# Patient Record
Sex: FEMALE | Race: WHITE | NOT HISPANIC OR LATINO | Employment: OTHER | URBAN - METROPOLITAN AREA
[De-identification: names, ages, dates, MRNs, and addresses within clinical notes are randomized per-mention and may not be internally consistent; named-entity substitution may affect disease eponyms.]

---

## 2022-03-21 DIAGNOSIS — Z11.59 ENCOUNTER FOR SCREENING FOR OTHER VIRAL DISEASES: Primary | ICD-10-CM

## 2022-04-12 RX ORDER — ROSUVASTATIN CALCIUM 10 MG/1
10 TABLET, COATED ORAL AT BEDTIME
COMMUNITY

## 2022-04-15 ENCOUNTER — ANESTHESIA EVENT (OUTPATIENT)
Dept: SURGERY | Facility: CLINIC | Age: 77
End: 2022-04-15
Payer: MEDICARE

## 2022-04-15 ENCOUNTER — LAB (OUTPATIENT)
Dept: LAB | Facility: CLINIC | Age: 77
End: 2022-04-15
Attending: ORTHOPAEDIC SURGERY
Payer: MEDICARE

## 2022-04-15 DIAGNOSIS — Z11.59 ENCOUNTER FOR SCREENING FOR OTHER VIRAL DISEASES: ICD-10-CM

## 2022-04-15 LAB — SARS-COV-2 RNA RESP QL NAA+PROBE: NEGATIVE

## 2022-04-15 PROCEDURE — U0005 INFEC AGEN DETEC AMPLI PROBE: HCPCS

## 2022-04-15 PROCEDURE — U0003 INFECTIOUS AGENT DETECTION BY NUCLEIC ACID (DNA OR RNA); SEVERE ACUTE RESPIRATORY SYNDROME CORONAVIRUS 2 (SARS-COV-2) (CORONAVIRUS DISEASE [COVID-19]), AMPLIFIED PROBE TECHNIQUE, MAKING USE OF HIGH THROUGHPUT TECHNOLOGIES AS DESCRIBED BY CMS-2020-01-R: HCPCS

## 2022-04-15 RX ORDER — VITAMIN B COMPLEX
1 TABLET ORAL DAILY
COMMUNITY

## 2022-04-15 NOTE — PROGRESS NOTES
PTA medications updated by Medication Scribe prior to surgery via phone call with patient (last doses completed by Nurse)     Medication history sources: Patient, H&P and Patient's home med list  In the past week, patient estimated taking medication this percent of the time: Greater than 90%  Adherence assessment: N/A Not Observed    Significant changes made to the medication list:  None      Additional medication history information:   None    Medication reconciliation completed by provider prior to medication history? No    Time spent in this activity: 35 minutes    The information provided in this note is only as accurate as the sources available at the time of update(s)    Prior to Admission medications    Medication Sig Last Dose Taking? Auth Provider   apixaban ANTICOAGULANT (ELIQUIS) 5 MG tablet Take 5 mg by mouth in the morning and 5 mg in the evening. 4/15/2022 at pm Yes Reported, Patient   Cephalexin (KEFLEX PO) Take 2,000 mg by mouth daily as needed (Prior to dental appointment 4 x 500 mg = 2000 mg)  at prn Yes Reported, Patient   Emollient (AVEENO CREAMY MOISTURIZING EX) Externally apply topically daily 4/15/2022 at am Yes Reported, Patient   FOLIC ACID PO Take 1 mg by mouth daily 4/15/2022 at am Yes Reported, Patient   rosuvastatin (CRESTOR) 10 MG tablet Take 10 mg by mouth At Bedtime  at pm Yes Reported, Patient   Vitamin D3 (CHOLECALCIFEROL) 25 mcg (1000 units) tablet Take 1 tablet by mouth daily 4/15/2022 at am Yes Reported, Patient   Zinc 50 MG CAPS Take 1 capsule by mouth daily 4/15/2022 at am Yes Reported, Patient   zolpidem (AMBIEN) 5 MG tablet Take 5 mg by mouth nightly as needed for sleep  at prn Yes Reported, Patient     Medication history completed by:    Morales Tadeo CPhT  Medication Scribe  [facility]

## 2022-04-18 ENCOUNTER — ANESTHESIA (OUTPATIENT)
Dept: SURGERY | Facility: CLINIC | Age: 77
End: 2022-04-18
Payer: MEDICARE

## 2022-04-18 ENCOUNTER — HOSPITAL ENCOUNTER (OUTPATIENT)
Facility: CLINIC | Age: 77
Discharge: HOME | End: 2022-04-18
Attending: ORTHOPAEDIC SURGERY | Admitting: ORTHOPAEDIC SURGERY
Payer: MEDICARE

## 2022-04-18 ENCOUNTER — APPOINTMENT (OUTPATIENT)
Dept: GENERAL RADIOLOGY | Facility: CLINIC | Age: 77
End: 2022-04-18
Attending: ORTHOPAEDIC SURGERY
Payer: MEDICARE

## 2022-04-18 VITALS
SYSTOLIC BLOOD PRESSURE: 127 MMHG | OXYGEN SATURATION: 95 % | TEMPERATURE: 98.5 F | BODY MASS INDEX: 37.97 KG/M2 | HEIGHT: 64 IN | RESPIRATION RATE: 18 BRPM | DIASTOLIC BLOOD PRESSURE: 84 MMHG | HEART RATE: 61 BPM | WEIGHT: 222.4 LBS

## 2022-04-18 DIAGNOSIS — Z98.890 S/P BUNIONECTOMY: Primary | ICD-10-CM

## 2022-04-18 PROCEDURE — 250N000011 HC RX IP 250 OP 636: Performed by: PHYSICIAN ASSISTANT

## 2022-04-18 PROCEDURE — 999N000179 XR SURGERY CARM FLUORO LESS THAN 5 MIN W STILLS

## 2022-04-18 PROCEDURE — 272N000001 HC OR GENERAL SUPPLY STERILE: Performed by: ORTHOPAEDIC SURGERY

## 2022-04-18 PROCEDURE — 370N000017 HC ANESTHESIA TECHNICAL FEE, PER MIN: Performed by: ORTHOPAEDIC SURGERY

## 2022-04-18 PROCEDURE — 258N000003 HC RX IP 258 OP 636: Performed by: ANESTHESIOLOGY

## 2022-04-18 PROCEDURE — 250N000011 HC RX IP 250 OP 636: Performed by: ORTHOPAEDIC SURGERY

## 2022-04-18 PROCEDURE — 250N000013 HC RX MED GY IP 250 OP 250 PS 637: Performed by: PHYSICIAN ASSISTANT

## 2022-04-18 PROCEDURE — 250N000009 HC RX 250: Performed by: NURSE ANESTHETIST, CERTIFIED REGISTERED

## 2022-04-18 PROCEDURE — 250N000025 HC SEVOFLURANE, PER MIN: Performed by: ORTHOPAEDIC SURGERY

## 2022-04-18 PROCEDURE — 710N000009 HC RECOVERY PHASE 1, LEVEL 1, PER MIN: Performed by: ORTHOPAEDIC SURGERY

## 2022-04-18 PROCEDURE — 360N000083 HC SURGERY LEVEL 3 W/ FLUORO, PER MIN: Performed by: ORTHOPAEDIC SURGERY

## 2022-04-18 PROCEDURE — 250N000009 HC RX 250: Performed by: ORTHOPAEDIC SURGERY

## 2022-04-18 PROCEDURE — 710N000012 HC RECOVERY PHASE 2, PER MINUTE: Performed by: ORTHOPAEDIC SURGERY

## 2022-04-18 PROCEDURE — 271N000001 HC OR GENERAL SUPPLY NON-STERILE: Performed by: ORTHOPAEDIC SURGERY

## 2022-04-18 PROCEDURE — C1713 ANCHOR/SCREW BN/BN,TIS/BN: HCPCS | Performed by: ORTHOPAEDIC SURGERY

## 2022-04-18 PROCEDURE — 250N000011 HC RX IP 250 OP 636: Performed by: NURSE ANESTHETIST, CERTIFIED REGISTERED

## 2022-04-18 PROCEDURE — 999N000141 HC STATISTIC PRE-PROCEDURE NURSING ASSESSMENT: Performed by: ORTHOPAEDIC SURGERY

## 2022-04-18 DEVICE — IMP SCR SYN CORTEX 2.0X16MM SELF TAP SS 201.816: Type: IMPLANTABLE DEVICE | Site: FOOT | Status: FUNCTIONAL

## 2022-04-18 DEVICE — IMP SCR SYN CORTEX 1.5X16MM SELF TAP SS 200.816: Type: IMPLANTABLE DEVICE | Site: FOOT | Status: FUNCTIONAL

## 2022-04-18 RX ORDER — OXYMETAZOLINE HYDROCHLORIDE 0.05 G/100ML
2 SPRAY NASAL 2 TIMES DAILY
COMMUNITY

## 2022-04-18 RX ORDER — CEFAZOLIN SODIUM/WATER 2 G/20 ML
2 SYRINGE (ML) INTRAVENOUS SEE ADMIN INSTRUCTIONS
Status: DISCONTINUED | OUTPATIENT
Start: 2022-04-18 | End: 2022-04-18 | Stop reason: HOSPADM

## 2022-04-18 RX ORDER — CEFAZOLIN SODIUM/WATER 2 G/20 ML
2 SYRINGE (ML) INTRAVENOUS
Status: COMPLETED | OUTPATIENT
Start: 2022-04-18 | End: 2022-04-18

## 2022-04-18 RX ORDER — ONDANSETRON 4 MG/1
4 TABLET, ORALLY DISINTEGRATING ORAL
Status: DISCONTINUED | OUTPATIENT
Start: 2022-04-18 | End: 2022-04-18 | Stop reason: HOSPADM

## 2022-04-18 RX ORDER — HYDROXYZINE HYDROCHLORIDE 10 MG/1
10 TABLET, FILM COATED ORAL
Status: DISCONTINUED | OUTPATIENT
Start: 2022-04-18 | End: 2022-04-18 | Stop reason: HOSPADM

## 2022-04-18 RX ORDER — ONDANSETRON 2 MG/ML
INJECTION INTRAMUSCULAR; INTRAVENOUS PRN
Status: DISCONTINUED | OUTPATIENT
Start: 2022-04-18 | End: 2022-04-18

## 2022-04-18 RX ORDER — DEXAMETHASONE SODIUM PHOSPHATE 4 MG/ML
INJECTION, SOLUTION INTRA-ARTICULAR; INTRALESIONAL; INTRAMUSCULAR; INTRAVENOUS; SOFT TISSUE PRN
Status: DISCONTINUED | OUTPATIENT
Start: 2022-04-18 | End: 2022-04-18

## 2022-04-18 RX ORDER — HYDROCODONE BITARTRATE AND ACETAMINOPHEN 5; 325 MG/1; MG/1
1 TABLET ORAL
Status: COMPLETED | OUTPATIENT
Start: 2022-04-18 | End: 2022-04-18

## 2022-04-18 RX ORDER — HYDROCODONE BITARTRATE AND ACETAMINOPHEN 5; 325 MG/1; MG/1
1-2 TABLET ORAL EVERY 4 HOURS PRN
Qty: 30 TABLET | Refills: 0 | Status: SHIPPED | OUTPATIENT
Start: 2022-04-18

## 2022-04-18 RX ORDER — OXYCODONE HYDROCHLORIDE 5 MG/1
5 TABLET ORAL EVERY 4 HOURS PRN
Status: DISCONTINUED | OUTPATIENT
Start: 2022-04-18 | End: 2022-04-18 | Stop reason: HOSPADM

## 2022-04-18 RX ORDER — FENTANYL CITRATE 0.05 MG/ML
50 INJECTION, SOLUTION INTRAMUSCULAR; INTRAVENOUS EVERY 5 MIN PRN
Status: DISCONTINUED | OUTPATIENT
Start: 2022-04-18 | End: 2022-04-18 | Stop reason: HOSPADM

## 2022-04-18 RX ORDER — ONDANSETRON 4 MG/1
4-8 TABLET, ORALLY DISINTEGRATING ORAL EVERY 6 HOURS PRN
Qty: 12 TABLET | Refills: 1 | Status: SHIPPED | OUTPATIENT
Start: 2022-04-18

## 2022-04-18 RX ORDER — HYDROMORPHONE HCL IN WATER/PF 6 MG/30 ML
0.2 PATIENT CONTROLLED ANALGESIA SYRINGE INTRAVENOUS EVERY 5 MIN PRN
Status: DISCONTINUED | OUTPATIENT
Start: 2022-04-18 | End: 2022-04-18 | Stop reason: HOSPADM

## 2022-04-18 RX ORDER — PROPOFOL 10 MG/ML
INJECTION, EMULSION INTRAVENOUS CONTINUOUS PRN
Status: DISCONTINUED | OUTPATIENT
Start: 2022-04-18 | End: 2022-04-18

## 2022-04-18 RX ORDER — DEXMEDETOMIDINE HYDROCHLORIDE 4 UG/ML
INJECTION, SOLUTION INTRAVENOUS PRN
Status: DISCONTINUED | OUTPATIENT
Start: 2022-04-18 | End: 2022-04-18

## 2022-04-18 RX ORDER — ROPIVACAINE HYDROCHLORIDE 5 MG/ML
INJECTION, SOLUTION EPIDURAL; INFILTRATION; PERINEURAL PRN
Status: DISCONTINUED | OUTPATIENT
Start: 2022-04-18 | End: 2022-04-18 | Stop reason: HOSPADM

## 2022-04-18 RX ORDER — SODIUM CHLORIDE, SODIUM LACTATE, POTASSIUM CHLORIDE, CALCIUM CHLORIDE 600; 310; 30; 20 MG/100ML; MG/100ML; MG/100ML; MG/100ML
INJECTION, SOLUTION INTRAVENOUS CONTINUOUS
Status: DISCONTINUED | OUTPATIENT
Start: 2022-04-18 | End: 2022-04-18 | Stop reason: HOSPADM

## 2022-04-18 RX ORDER — LIDOCAINE HYDROCHLORIDE 20 MG/ML
INJECTION, SOLUTION INFILTRATION; PERINEURAL PRN
Status: DISCONTINUED | OUTPATIENT
Start: 2022-04-18 | End: 2022-04-18

## 2022-04-18 RX ORDER — ROPIVACAINE HYDROCHLORIDE 5 MG/ML
INJECTION, SOLUTION EPIDURAL; INFILTRATION; PERINEURAL
Status: DISCONTINUED
Start: 2022-04-18 | End: 2022-04-18 | Stop reason: HOSPADM

## 2022-04-18 RX ORDER — LABETALOL HYDROCHLORIDE 5 MG/ML
10 INJECTION, SOLUTION INTRAVENOUS
Status: DISCONTINUED | OUTPATIENT
Start: 2022-04-18 | End: 2022-04-18 | Stop reason: HOSPADM

## 2022-04-18 RX ORDER — DIMENHYDRINATE 50 MG/ML
25 INJECTION, SOLUTION INTRAMUSCULAR; INTRAVENOUS
Status: DISCONTINUED | OUTPATIENT
Start: 2022-04-18 | End: 2022-04-18 | Stop reason: HOSPADM

## 2022-04-18 RX ORDER — PROPOFOL 10 MG/ML
INJECTION, EMULSION INTRAVENOUS PRN
Status: DISCONTINUED | OUTPATIENT
Start: 2022-04-18 | End: 2022-04-18

## 2022-04-18 RX ORDER — ONDANSETRON 4 MG/1
4 TABLET, ORALLY DISINTEGRATING ORAL EVERY 30 MIN PRN
Status: DISCONTINUED | OUTPATIENT
Start: 2022-04-18 | End: 2022-04-18 | Stop reason: HOSPADM

## 2022-04-18 RX ORDER — FENTANYL CITRATE 50 UG/ML
INJECTION, SOLUTION INTRAMUSCULAR; INTRAVENOUS PRN
Status: DISCONTINUED | OUTPATIENT
Start: 2022-04-18 | End: 2022-04-18

## 2022-04-18 RX ORDER — AMOXICILLIN 250 MG
1-2 CAPSULE ORAL 2 TIMES DAILY
Qty: 30 TABLET | Refills: 0 | Status: SHIPPED | OUTPATIENT
Start: 2022-04-18

## 2022-04-18 RX ORDER — ACETAMINOPHEN 325 MG/1
975 TABLET ORAL ONCE
Status: DISCONTINUED | OUTPATIENT
Start: 2022-04-18 | End: 2022-04-18 | Stop reason: HOSPADM

## 2022-04-18 RX ORDER — MAGNESIUM HYDROXIDE 1200 MG/15ML
LIQUID ORAL PRN
Status: DISCONTINUED | OUTPATIENT
Start: 2022-04-18 | End: 2022-04-18 | Stop reason: HOSPADM

## 2022-04-18 RX ORDER — ONDANSETRON 2 MG/ML
4 INJECTION INTRAMUSCULAR; INTRAVENOUS EVERY 30 MIN PRN
Status: DISCONTINUED | OUTPATIENT
Start: 2022-04-18 | End: 2022-04-18 | Stop reason: HOSPADM

## 2022-04-18 RX ORDER — HYDRALAZINE HYDROCHLORIDE 20 MG/ML
2.5-5 INJECTION INTRAMUSCULAR; INTRAVENOUS EVERY 10 MIN PRN
Status: DISCONTINUED | OUTPATIENT
Start: 2022-04-18 | End: 2022-04-18 | Stop reason: HOSPADM

## 2022-04-18 RX ADMIN — PROPOFOL 200 MG: 10 INJECTION, EMULSION INTRAVENOUS at 07:27

## 2022-04-18 RX ADMIN — ONDANSETRON 4 MG: 2 INJECTION INTRAMUSCULAR; INTRAVENOUS at 07:31

## 2022-04-18 RX ADMIN — DEXMEDETOMIDINE HYDROCHLORIDE 8 MCG: 200 INJECTION INTRAVENOUS at 07:31

## 2022-04-18 RX ADMIN — PROPOFOL 30 MCG/KG/MIN: 10 INJECTION, EMULSION INTRAVENOUS at 07:31

## 2022-04-18 RX ADMIN — DEXAMETHASONE SODIUM PHOSPHATE 4 MG: 4 INJECTION, SOLUTION INTRA-ARTICULAR; INTRALESIONAL; INTRAMUSCULAR; INTRAVENOUS; SOFT TISSUE at 07:31

## 2022-04-18 RX ADMIN — SODIUM CHLORIDE, POTASSIUM CHLORIDE, SODIUM LACTATE AND CALCIUM CHLORIDE: 600; 310; 30; 20 INJECTION, SOLUTION INTRAVENOUS at 07:16

## 2022-04-18 RX ADMIN — FENTANYL CITRATE 50 MCG: 50 INJECTION, SOLUTION INTRAMUSCULAR; INTRAVENOUS at 07:27

## 2022-04-18 RX ADMIN — Medication 2 G: at 07:16

## 2022-04-18 RX ADMIN — FENTANYL CITRATE 25 MCG: 50 INJECTION, SOLUTION INTRAMUSCULAR; INTRAVENOUS at 07:40

## 2022-04-18 RX ADMIN — LIDOCAINE HYDROCHLORIDE 100 MG: 20 INJECTION, SOLUTION INFILTRATION; PERINEURAL at 07:27

## 2022-04-18 RX ADMIN — HYDROCODONE BITARTRATE AND ACETAMINOPHEN 1 TABLET: 5; 325 TABLET ORAL at 08:33

## 2022-04-18 ASSESSMENT — LIFESTYLE VARIABLES: TOBACCO_USE: 0

## 2022-04-18 ASSESSMENT — ENCOUNTER SYMPTOMS: DYSRHYTHMIAS: 1

## 2022-04-18 NOTE — INTERVAL H&P NOTE
"I have reviewed the surgical (or preoperative) H&P that is linked to this encounter, and examined the patient. There are no significant changes    Clinical Conditions Present on Arrival:  Clinically Significant Risk Factors Present on Admission                 # Coagulation Defect: home medication list includes an anticoagulant medication   # Obesity: Estimated body mass index is 38.17 kg/m  as calculated from the following:    Height as of this encounter: 1.626 m (5' 4\").    Weight as of this encounter: 100.9 kg (222 lb 6.4 oz).       "

## 2022-04-18 NOTE — ANESTHESIA PREPROCEDURE EVALUATION
Anesthesia Pre-Procedure Evaluation    Patient: Jaqueline Lindquist   MRN: [id number] : 1945        Procedure : Procedure(s):  RIGHT BUNION REPAIR          Past Medical History:   Diagnosis Date     Abnormal weight gain      Abnormal weight gain      Acute maxillary sinusitis 2014     Acute upper respiratory infections of unspecified site 2014     Antiplatelet or antithrombotic long-term use      Arrhythmia     A FIB     Closed fracture of one or more phalanges of foot     foot     Closed fracture of one or more phalanges of foot     foot, side not specified in pre-op work up      Dermatitis     suggested she avoid make-up, facial products and nutritional triggers     Dietary surveillance and counseling      Dysuria 2014     Exercise counseling      Obesity      Osteoarthrosis, unspecified whether generalized or localized, lower leg     knees     Osteoarthrosis, unspecified whether generalized or localized, lower leg     knees     Other acute otitis externa     Acute nec     Personal history of malignant neoplasm of breast     left , , did chemo, then tamoxifen x 5 yrs     Sciatica      Sciatica 2009     Sprain of foot, unspecified site      Sprain of neck      Urinary tract infection, site not specified 2014      Past Surgical History:   Procedure Laterality Date     ARTHROPLASTY KNEE  3/3/2014    Procedure: ARTHROPLASTY KNEE;  RIGHT TOTAL KNEE ARTHROPLASTY ([employer] NEPHEW)^;  Surgeon: Abilio Franklin MD;  Location: [facility] OR     ARTHROPLASTY KNEE Left 2016    Procedure: ARTHROPLASTY KNEE;  Surgeon: Abilio Franklin MD;  Location:  OR     BREAST SURGERY      (L) mastectomy     CHOLECYSTECTOMY       ORTHOPEDIC SURGERY      ORIF (L) ankle     ORTHOPEDIC SURGERY      knee arthroscopy on (R) X 2, left x1      Allergies   Allergen Reactions     Atorvastatin Other (See Comments) and Muscle Pain (Myalgia)     Leg pain      Social History     Tobacco Use      Smoking status: Never Smoker     Smokeless tobacco: Never Used   Substance Use Topics     Alcohol use: Yes     Comment: occas      Wt Readings from Last 1 Encounters:   04/18/22 100.9 kg (222 lb 6.4 oz)        Anesthesia Evaluation   Pt has had prior anesthetic. Type: General.    No history of anesthetic complications       ROS/MED HX  ENT/Pulmonary:     (+) sleep apnea, uses CPAP, recent URI, resolved, Prescribed Z-pack for URI on 4/13/22, didn't take; symptoms mostly resolved:  (-) tobacco use   Neurologic:     (+) peripheral neuropathy, - Sciatica.     Cardiovascular:     (+) -----Taking blood thinners Instructions Given to patient: Eliquis. dysrhythmias, a-fib,     METS/Exercise Tolerance:     Hematologic:       Musculoskeletal:   (+) arthritis,     GI/Hepatic:       Renal/Genitourinary:       Endo:     (+) Obesity (Class 2),     Psychiatric/Substance Use:       Infectious Disease:       Malignancy:   (+) Malignancy, History of Breast.Breast CA Remission status post Surgery.        Other:               OUTSIDE LABS:  CBC:   Lab Results   Component Value Date    HGB 10.0 (L) 08/04/2016    HGB 11.1 (L) 08/03/2016     08/03/2016     08/02/2016     BMP:   Lab Results   Component Value Date     08/03/2016     08/02/2016    POTASSIUM 3.9 08/03/2016    POTASSIUM 4.1 08/02/2016    CHLORIDE 102 08/03/2016    CHLORIDE 109 08/02/2016    CO2 25 08/03/2016    CO2 23 08/02/2016    BUN 18 08/03/2016    BUN 15 08/02/2016    CR 0.73 08/03/2016    CR 0.65 08/02/2016     (H) 08/03/2016     (H) 08/02/2016     COAGS:   Lab Results   Component Value Date    INR 0.96 08/02/2016     POC: No results found for: BGM, HCG, HCGS  HEPATIC: No results found for: ALBUMIN, PROTTOTAL, ALT, AST, GGT, ALKPHOS, BILITOTAL, BILIDIRECT, WILIAM  OTHER:   Lab Results   Component Value Date    TRISH 8.5 08/03/2016       Anesthesia Plan    ASA Status:  3   NPO Status:  NPO Appropriate    Anesthesia Type: General.      - Airway: LMA   Induction: Intravenous.   Maintenance: Balanced.        Consents    Anesthesia Plan(s) and associated risks, benefits, and realistic alternatives discussed. Questions answered and patient/representative(s) expressed understanding.    - Discussed:     - Discussed with:  Patient         Postoperative Care    Pain management: IV analgesics, Multi-modal analgesia, Oral pain medications.   PONV prophylaxis: Ondansetron (or other 5HT-3), Dexamethasone or Solumedrol     Comments:    Other Comments: Glaucoma, avoid scop            Quinten Albarado MD

## 2022-04-18 NOTE — ANESTHESIA CARE TRANSFER NOTE
Patient: Jaqueline Lindquist    Procedure: Procedure(s):  RIGHT BUNION REPAIR       Diagnosis: Bunion of great toe of right foot [M21.611]  Senile arthritis [M19.90]  Diagnosis Additional Information: No value filed.    Anesthesia Type:   General     Note:    Oropharynx: oropharynx clear of all foreign objects and spontaneously breathing  Level of Consciousness: awake  Oxygen Supplementation: face mask    Independent Airway: airway patency satisfactory and stable  Dentition: dentition unchanged  Vital Signs Stable: post-procedure vital signs reviewed and stable  Report to RN Given: handoff report given  Patient transferred to: PACU    Handoff Report: Identifed the Patient, Identified the Reponsible Provider, Reviewed the pertinent medical history, Discussed the surgical course, Reviewed Intra-OP anesthesia mangement and issues during anesthesia, Set expectations for post-procedure period and Allowed opportunity for questions and acknowledgement of understanding      Vitals:  Vitals Value Taken Time   /0103    Temp     Pulse 63    Resp 12    SpO2 99        Electronically Signed By: MELANIE Alvarez CRNA  April 18, 2022  8:05 AM

## 2022-04-18 NOTE — DISCHARGE INSTRUCTIONS
Same Day Surgery Discharge Instructions for  Sedation and General Anesthesia     It's not unusual to feel dizzy, light-headed or faint for up to 24 hours after surgery or while taking pain medication.  If you have these symptoms: sit for a few minutes before standing and have someone assist you when you get up to walk or use the bathroom.    You should rest and relax for the next 24 hours. We recommend you make arrangements to have an adult stay with you for at least 24 hours after your discharge.  Avoid hazardous and strenuous activity.    DO NOT DRIVE any vehicle or operate mechanical equipment for 24 hours following the end of your surgery.  Even though you may feel normal, your reactions may be affected by the medication you have received.    Do not drink alcoholic beverages for 24 hours following surgery.     Slowly progress to your regular diet as you feel able. It's not unusual to feel nauseated and/or vomit after receiving anesthesia.  If you develop these symptoms, drink clear liquids (apple juice, ginger ale, broth, 7-up, etc. ) until you feel better.  If your nausea and vomiting persists for 24 hours, please notify your surgeon.      All narcotic pain medications, along with inactivity and anesthesia, can cause constipation. Drinking plenty of liquids and increasing fiber intake will help.    For any questions of a medical nature, call your surgeon.    Do not make important decisions for 24 hours.    If you had general anesthesia, you may have a sore throat for a couple of days related to the breathing tube used during surgery.  You may use Cepacol lozenges to help with this discomfort.  If it worsens or if you develop a fever, contact your surgeon.     If you feel your pain is not well managed with the pain medications prescribed by your surgeon, please contact your surgeon's office to let them know so they can address your concerns.       CoVid 19 Information    We want to give you information regarding  Covid. Please consult your primary care provider with any questions you might have.     Patient who have symptoms (cough, fever, or shortness of breath), need to isolate for 7 days from when symptoms started OR 72 hours after fever resolves (without fever reducing medications) AND improvement of respiratory symptoms (whichever is longer).    Isolate yourself at home (in own room/own bathroom if possible)  Do Not allow any visitors  Do Not go to work or school  Do Not go to [employer],  centers, shopping, or other public places.  Do Not shake hands.  Avoid close and intimate contact with others (hugging, kissing).  Follow CDC recommendations for household cleaning of frequently touched services.     After the initial 7 days, continue to isolate yourself from household members as much as possible. To continue decrease the risk of community spread and exposure, you and any members of your household should limit activities in public for 14 days after starting home isolation.     You can reference the following CDC link for helpful home isolation/care tips:  https://www.cdc.gov/coronavirus/2019-ncov/downloads/10Things.pdf    Protect Others:  Cover Your Mouth and Nose with a mask, disposable tissue or wash cloth to avoid spreading germs to others.  Wash your hands and face frequently with soap and water    Call Your Primary Doctor If: Breathing difficulty develops or you become worse.    For more information about COVID19 and options for caring for yourself at home, please visit the CDC website at https://www.cdc.gov/coronavirus/2019-ncov/about/steps-when-sick.html  For more options for care at [facility], please visit our website at [email or web address]          POST-OPERATIVE INSTRUCTIONS  FOOT AND ANKLE SURGERY  AROLDO Navarrete M.D.  Edouard Smith P.A.-C    These precautions MUST be followed for the first 24 hours after surgery:  Upon discharge, go directly home.  You must  make arrangements to have a responsible adult stay with you.  DO NOT DRINK ALCOHOLIC BEVERAGES  It is not unusual to feel lightheaded up to 24 hours after surgery or while taking pain medication.  If you feel lightheaded, sit for a few minutes before standing and have someone assisted you when you get up to walk or use the restroom.  Do not use any mechanical equipment.  Do not make any important or legal decisions for 24 hours or while on pain medications.  You may experience dry mouth, sore throat, or sleep disturbances from the anesthesia and medications used during surgery.  Generalized muscle aches can sometimes occur.  These usually disappear in 12-24 hours.    POST-OPERATIVE CARE GUIDELINES    The following are general guidelines about what to expect the first days/weeks after surgery.  They are not specific, and your recovery may be slightly different.    Blood clots are not common, but are emergencies.  If you have sudden onset pain in your calf or leg, or have sudden shortness of breath, go to the Emergency Department.    Elevation of your operative foot/ankle is key to reducing the swelling in the immediate post-operative phase (first 3-5 days).  When you are at rest, elevate your foot at or above the level of your heart.  When sitting, your foot should be elevated on a chair or stool; not hanging down.    You should plan to rest the day after surgery no matter how minor the procedure.  More complicated procedures will require more time to return to normal activity.    Foot and ankle surgery is painful in most cases.   It is not unusual for the pain to be worse a day or two after surgery than on the day of.  If your pain is more than 8/10 contact our office.  If you don t have pain, gradually decrease your pain meds by substituting Tylenol.  Please don t use Advil/ibuprofen unless we order it for you.  Pt may resume regular home medications of:  ALL PRESCRIBED BLOOD THINNERS (INCLUDING ASA 81MG) on the  day after surgery.      You will be prescribed Percocet or Vicodin for pain, Vistaril for spasms/pain adjunct, Senokot for constipation.  If you have had trouble taking these meds before or experience nausea or vomiting after surgery from your medication, please advise the recovery room nurses.  If you are already at home, try drinking only clear liquids and/or call our office.  Start taking narcotic pain medication when you feel sensation in your lower extremity after a block.     All pain medications, along with inactivity can cause constipation.  Use the Senakot as directed, increase fluid intake to 1 quart per day and increase your dietary fiber.  (The  P  fruits - peaches, plums, pears, and prunes as well as anise/black licorice are recommended.)  It is not unusual to run a low-grade fever after surgery.  If your temperature is elevated above 102 , lasts longer than 24 hours, or is questionable in any way, contact our office.    Drainage from your cast/dressing is normal.  Reinforce your cast/dressing with 4x4 dressings and cover with an Ace wrap.  Wait until 24 hours after surgery to change your dressings; by this time most of your bleeding will have stopped.  If you have drainage through your dressings 2 days after surgery, contact our office.    You cast/dressing will be changed at your 2-week follow up appointment.  They should be kept clean and DRY.  If your cast/dressing is damaged before that, contact our office.    It is normal to experience some bruising in the toes after surgery and they may appear  dark  when your foot hangs down.  It is important to actively wiggle your toes for at least 5-10 minutes each hour UNLESS you had surgery on those toes.    Use ice on your foot/ankle over the dressing/cast for 20 minutes per hour, 10 or more times per day.  A large bag of frozen peas works well.    Bathing: take a tub or sponge bath instead of a shower if possible during the first two weeks.  If you choose  to shower, cover the dressing/cast with a waterproof covering, these may be ordered from www.seal-tight.com or are available at some pharmacies/medical supply stores.  Another option is XeroSox, which is the original vacuum sealed bandage and cast cover.  The cast cover has a vacuum seal and is absolutely waterproof.  [phone] or www.xerosox.finalsite for more information.    Driving:  For surgery on the left foot/ankle, you may drive as soon as narcotic medications are stopped.  For surgery on the right foot/ankle, you may drive when you are out of a cast, off pain medications, and you feel secure with braking.    In general, listen to your foot/ankle.  If you have a sudden, dramatic increase in pain that does not resolve after an hour or so, something is wrong - call our office.  If you fall or bump your foot/ankle and have sudden pain that resolves, give it 24 hours before you call.    Many of your questions can be addressed at your 2-week follow-up appointment - please make a list of things to ask us as they come up during your recovery.    If you had a nerve block it is common to have numbness in your leg and foot for up to 30 hours after surgery.  If your leg or foot is still numb more than 30 hours after surgery, please call the office.    FUTURE DENTIST VISITS:  If you have had a total ankle arthroplasty please be advised you must be on antibiotics prior to ANY dental work.  Please call your dentist office ahead of time and make them aware of this as your dentist will be able to order the prescription.  If you have had any other surgery this is not a concern.      You may resume all prescribed blood thinners--including aspirin, on the day after surgery.      If you have any further questions or concerns, please call our office at [phone]

## 2022-04-18 NOTE — ANESTHESIA POSTPROCEDURE EVALUATION
Patient: Jaqueline Lindquist    Procedure: Procedure(s):  RIGHT BUNION REPAIR       Anesthesia Type:  General    Note:     Postop Pain Control: Uneventful            Sign Out: Well controlled pain   PONV: No   Neuro/Psych: Uneventful            Sign Out: Acceptable/Baseline neuro status   Airway/Respiratory: Uneventful            Sign Out: Acceptable/Baseline resp. status   CV/Hemodynamics: Uneventful            Sign Out: Acceptable CV status   Other NRE: NONE   DID A NON-ROUTINE EVENT OCCUR? No           Last vitals:  Vitals Value Taken Time   /93 04/18/22 0845   Temp 36.7  C (98.1  F) 04/18/22 0804   Pulse 56 04/18/22 0845   Resp 18 04/18/22 0830   SpO2 99 % 04/18/22 0845       Electronically Signed By: Quinten Albarado MD  April 18, 2022  2:10 PM

## 2022-04-18 NOTE — ANESTHESIA PROCEDURE NOTES
Airway       Patient location during procedure: OR  Staff -        Anesthesiologist:  Quinten Albarado MD       CRNA: Keyla Walsh APRN CRNA       Performed By: anesthesiologist  Consent for Airway        Urgency: elective  Indications and Patient Condition       Indications for airway management: mahogany-procedural         Mask difficulty assessment: 0 - not attempted    Final Airway Details       Final airway type: supraglottic airway    Supraglottic Airway Details        Type: LMA       Brand: Ambu AuraGain       LMA size: 4    Post intubation assessment        Placement verified by: capnometry, equal breath sounds and chest rise        Number of attempts at approach: 1       Number of other approaches attempted: 0       Secured with: commercial tube carrasquillo and pink tape       Ease of procedure: easy       Dentition: Intact and Unchanged

## 2022-04-18 NOTE — OP NOTE
Procedure Date: 04/18/2022    PREOPERATIVE DIAGNOSES:  Severe right-sided bunion deformity with significant hallux rigidus.    POSTOPERATIVE DIAGNOSES:  Severe right-sided bunion deformity with significant hallux rigidus.    SURGICAL PROCEDURES:   BOAT metatarsal osteotomy and bunion repair, as well as an Nithin osteotomy of the proximal phalanx.    ANESTHETIC:  General.    SURGEON:  Josh Navarrete MD    ASSISTANT:  Edouard Smith PA-C    PREAMBLE:  Ms. Lindquist presented with a severe metatarsus primus varus hallux valgus.  She also has significant arthritis of the right great toe MTP joint.  She elected not to have a fusion, and we decided to do a cheilectomy and debridement and also bunion repair with metatarsal and phalangeal osteotomy.  Informed consent was obtained.    DESCRIPTION OF PROCEDURE:  After adequate induction of a general anesthetic, the patient was positioned supine on the operating table.  The right leg was sterilely prepped and free-draped in the usual fashion.  Tourniquet around the thigh was inflated to 300 mmHg.    A 6 cm longitudinal incision was made along the medial border of the great toe MTP joint.  The capsule was incised longitudinally.  The medial eminence was removed with a saw.  The osteophytes were also removed from the base of the proximal phalanx, as well as the dorsum of the metatarsal head.  There was very little articular cartilage in the joint.    This was followed by a 90-degree metatarsal osteotomy with a long plantar limb.  The capital fragment was translated laterally by 5 mm and immobilized with 2 screws.  The sesamoids were reduced and the medial capsule repaired.    This was followed by a 20-degree medially-based closing wedge osteotomy of the proximal phalanx.  The osteotomy was secured with a suture.  This gave excellent alignment of her foot.    The tourniquet was deflated.  Hemostasis obtained.  Wounds closed in layers.  A sterile dressing and a light compressive  bandage applied followed by a bunion shoe.  She tolerated the procedure well and was taken to the recovery room in satisfactory condition.    She can ambulate weightbearing as tolerated.  Sutures will be removed in 2 weeks.    Josh Navarrete MD        D: 2022   T: 2022   MT: MABLE    Name:     LUCIANA COLLIER  MRN:      [id number]        Account:        [id number]   :      1945           Procedure Date: 2022     Document: [id number]

## 2022-06-12 ENCOUNTER — HEALTH MAINTENANCE LETTER (OUTPATIENT)
Age: 77
End: 2022-06-12

## 2022-10-29 ENCOUNTER — HEALTH MAINTENANCE LETTER (OUTPATIENT)
Age: 77
End: 2022-10-29

## 2023-06-24 ENCOUNTER — HEALTH MAINTENANCE LETTER (OUTPATIENT)
Age: 78
End: 2023-06-24

## 2024-08-17 ENCOUNTER — HEALTH MAINTENANCE LETTER (OUTPATIENT)
Age: 79
End: 2024-08-17

## (undated) DEVICE — SOL WATER IRRIG 1000ML BOTTLE 2F7114

## (undated) DEVICE — SU ETHILON 3-0 FS-1 18" 669H

## (undated) DEVICE — DRILL BIT MINI 60MM QC 1.1MM  310.11

## (undated) DEVICE — DRSG ABDOMINAL 07 1/2X8" 7197D

## (undated) DEVICE — PREP DURAPREP 26ML APL 8630

## (undated) DEVICE — IMM LIMB ELEVATOR DC40-0203

## (undated) DEVICE — BLADE SAW OSCILLATING STRYK MED 9.0X25X0.38MM 2296-003-111

## (undated) DEVICE — BNDG ELASTIC 4"X5YDS UNSTERILE 6611-40

## (undated) DEVICE — TOURNIQUET SGL  BLADDER 30"X4" BLUE 5921030135

## (undated) DEVICE — GLOVE PROTEXIS W/NEU-THERA 8.0  2D73TE80

## (undated) DEVICE — GOWN XXLG REINFORCED 9071EL

## (undated) DEVICE — GLOVE PROTEXIS MICRO 8.5  2D73PM85

## (undated) DEVICE — SOL NACL 0.9% IRRIG 1000ML BOTTLE 2F7124

## (undated) DEVICE — PACK EXTREMITY SOP15EXFSD

## (undated) DEVICE — GLOVE PROTEXIS W/NEU-THERA 8.5  2D73TE85

## (undated) DEVICE — DRILL BIT MINI 85MM QC 1.5MM  310.15

## (undated) DEVICE — GLOVE PROTEXIS W/NEU-THERA 7.5  2D73TE75

## (undated) DEVICE — CAST PADDING 4" STERILE 9044S

## (undated) DEVICE — LINEN TOWEL PACK X5 5464

## (undated) RX ORDER — PROPOFOL 10 MG/ML
INJECTION, EMULSION INTRAVENOUS
Status: DISPENSED
Start: 2022-04-18

## (undated) RX ORDER — ONDANSETRON 2 MG/ML
INJECTION INTRAMUSCULAR; INTRAVENOUS
Status: DISPENSED
Start: 2022-04-18

## (undated) RX ORDER — CEFAZOLIN SODIUM/WATER 2 G/20 ML
SYRINGE (ML) INTRAVENOUS
Status: DISPENSED
Start: 2022-04-18

## (undated) RX ORDER — HYDROCODONE BITARTRATE AND ACETAMINOPHEN 5; 325 MG/1; MG/1
TABLET ORAL
Status: DISPENSED
Start: 2022-04-18

## (undated) RX ORDER — FENTANYL CITRATE 50 UG/ML
INJECTION, SOLUTION INTRAMUSCULAR; INTRAVENOUS
Status: DISPENSED
Start: 2022-04-18

## (undated) RX ORDER — LIDOCAINE HYDROCHLORIDE 20 MG/ML
INJECTION, SOLUTION EPIDURAL; INFILTRATION; INTRACAUDAL; PERINEURAL
Status: DISPENSED
Start: 2022-04-18

## (undated) RX ORDER — DEXAMETHASONE SODIUM PHOSPHATE 4 MG/ML
INJECTION, SOLUTION INTRA-ARTICULAR; INTRALESIONAL; INTRAMUSCULAR; INTRAVENOUS; SOFT TISSUE
Status: DISPENSED
Start: 2022-04-18